# Patient Record
Sex: MALE | Race: WHITE | NOT HISPANIC OR LATINO | URBAN - METROPOLITAN AREA
[De-identification: names, ages, dates, MRNs, and addresses within clinical notes are randomized per-mention and may not be internally consistent; named-entity substitution may affect disease eponyms.]

---

## 2018-01-12 ENCOUNTER — EMERGENCY (EMERGENCY)
Facility: HOSPITAL | Age: 68
LOS: 1 days | Discharge: HOME | End: 2018-01-12
Attending: EMERGENCY MEDICINE
Payer: MEDICARE

## 2018-01-12 VITALS
RESPIRATION RATE: 16 BRPM | WEIGHT: 184.97 LBS | SYSTOLIC BLOOD PRESSURE: 111 MMHG | HEIGHT: 67 IN | DIASTOLIC BLOOD PRESSURE: 78 MMHG | HEART RATE: 89 BPM | OXYGEN SATURATION: 99 %

## 2018-01-12 LAB
ALBUMIN SERPL ELPH-MCNC: 2 G/DL — LOW (ref 3.5–5)
ANION GAP SERPL CALC-SCNC: 10 MMOL/L — SIGNIFICANT CHANGE UP (ref 5–17)
BASOPHILS # BLD AUTO: 0 K/UL — SIGNIFICANT CHANGE UP (ref 0–0.2)
BASOPHILS NFR BLD AUTO: 0.7 % — SIGNIFICANT CHANGE UP (ref 0–2)
BUN SERPL-MCNC: 101 MG/DL — HIGH (ref 7–18)
CALCIUM SERPL-MCNC: 9 MG/DL — SIGNIFICANT CHANGE UP (ref 8.4–10.5)
CHLORIDE SERPL-SCNC: 110 MMOL/L — HIGH (ref 96–108)
CO2 SERPL-SCNC: 23 MMOL/L — SIGNIFICANT CHANGE UP (ref 22–31)
EOSINOPHIL # BLD AUTO: 0.1 K/UL — SIGNIFICANT CHANGE UP (ref 0–0.5)
EOSINOPHIL NFR BLD AUTO: 1.7 % — SIGNIFICANT CHANGE UP (ref 0–6)
GLUCOSE BLDC GLUCOMTR-MCNC: 112 MG/DL — HIGH (ref 70–99)
GLUCOSE SERPL-MCNC: 100 MG/DL — HIGH (ref 70–99)
HCT VFR BLD CALC: 24.4 % — LOW (ref 39–50)
HGB BLD-MCNC: 7.3 G/DL — LOW (ref 13–17)
INR BLD: 1.16 RATIO — SIGNIFICANT CHANGE UP (ref 0.88–1.16)
LACTATE SERPL-SCNC: 0.7 MMOL/L — SIGNIFICANT CHANGE UP (ref 0.7–2)
LYMPHOCYTES # BLD AUTO: 0.7 K/UL — LOW (ref 1–3.3)
LYMPHOCYTES # BLD AUTO: 14.1 % — SIGNIFICANT CHANGE UP (ref 13–44)
MCHC RBC-ENTMCNC: 29.4 PG — SIGNIFICANT CHANGE UP (ref 27–34)
MCHC RBC-ENTMCNC: 29.8 GM/DL — LOW (ref 32–36)
MCV RBC AUTO: 98.9 FL — SIGNIFICANT CHANGE UP (ref 80–100)
MONOCYTES # BLD AUTO: 0.4 K/UL — SIGNIFICANT CHANGE UP (ref 0–0.9)
MONOCYTES NFR BLD AUTO: 8.1 % — SIGNIFICANT CHANGE UP (ref 2–14)
NEUTROPHILS # BLD AUTO: 3.6 K/UL — SIGNIFICANT CHANGE UP (ref 1.8–7.4)
NEUTROPHILS NFR BLD AUTO: 75.5 % — SIGNIFICANT CHANGE UP (ref 43–77)
PLATELET # BLD AUTO: 200 K/UL — SIGNIFICANT CHANGE UP (ref 150–400)
POTASSIUM SERPL-MCNC: 4.5 MMOL/L — SIGNIFICANT CHANGE UP (ref 3.5–5.3)
POTASSIUM SERPL-SCNC: 4.5 MMOL/L — SIGNIFICANT CHANGE UP (ref 3.5–5.3)
PROTHROM AB SERPL-ACNC: 12.7 SEC — SIGNIFICANT CHANGE UP (ref 9.8–12.7)
RBC # BLD: 2.47 M/UL — LOW (ref 4.2–5.8)
RBC # FLD: 16 % — HIGH (ref 10.3–14.5)
SODIUM SERPL-SCNC: 143 MMOL/L — SIGNIFICANT CHANGE UP (ref 135–145)
WBC # BLD: 4.7 K/UL — SIGNIFICANT CHANGE UP (ref 3.8–10.5)
WBC # FLD AUTO: 4.7 K/UL — SIGNIFICANT CHANGE UP (ref 3.8–10.5)

## 2018-01-12 PROCEDURE — 99285 EMERGENCY DEPT VISIT HI MDM: CPT | Mod: 25

## 2018-01-12 PROCEDURE — 49465 FLUORO EXAM OF G/COLON TUBE: CPT

## 2018-01-12 PROCEDURE — 43753 TX GASTRO INTUB W/ASP: CPT

## 2018-01-12 RX ORDER — SODIUM CHLORIDE 9 MG/ML
1000 INJECTION INTRAMUSCULAR; INTRAVENOUS; SUBCUTANEOUS ONCE
Qty: 0 | Refills: 0 | Status: COMPLETED | OUTPATIENT
Start: 2018-01-12 | End: 2018-01-12

## 2018-01-12 NOTE — ED ADULT NURSE NOTE - OBJECTIVE STATEMENT
pt is a 66 y/o male  sent from nursing home for g tube placement confirmation. pt is a vent dependent  . pt . no distress, g tube is clean no signs of any infection pt stable.

## 2018-01-12 NOTE — ED ADULT NURSE REASSESSMENT NOTE - NS ED NURSE REASSESS COMMENT FT1
Patient was received from [facility] . S/P tracheostomy on a vent . Blood glucose tested , results on file. Labs collected and sent. Gastrografin administered. Awaiting for CT Scan. Patient was received from [facility] . S/P tracheostomy on a vent . Blood glucose tested , results on file. Labs collected and sent. Gastrografin administered; PEG placement confirmed. Awaiting for CT Scan. Patient reposioned in bed for comfort.

## 2018-01-12 NOTE — ED PROVIDER NOTE - PROGRESS NOTE DETAILS
Nova: s/o from Dr burdick. for g tube placement.  xr shows - contrast noted in small bowel, g gtube in place.  vss. on vent  Dx g tube malfunction.  return to nsg home. Nova: s/o from Dr burdick. for g tube placement.  xr shows - contrast noted in small bowel, g gtube in place.  Called by radialogist concern for sigmoid volvulus-  Spoke with Surgical house staff and rec to call Gi.  GI called Dr Navarro and rec to obtain CT abd with po contrast to evaluate volvulus.  po contrast given. pending CT scan.  vss. on vent  Dx g tube malfunction.  return to [facility] home. Nova: pt too tremulous to perform scan. ativan 1mg ivp given and will go for scan. informed the importance of completing the scan.  S/o to Dr Kang to f/u on ct scan and call Dr Navarro and surgical house staff. Nova: pt too tremulous to perform scan. ativan 1mg ivp given and will go for scan. informed the importance of completing the scan. Nova: 2 ativan IVP and ns given to assist in tremors.  Spoke with Radiologist Dr Liz  [phone] and agree to do the scan but should be more sedated and avoid tremors.  2 mg ivp ordered and keppra 1g.  Attempted to call Healthcare proxy on the [facility] home list but none are active.  Attempted to Called Dr Ignacio [phone] unable to reach.  PT is a DNR/ DNI/ Do Not Hospitalized.  Pending ct.  S/o to Dr Kang to f/u on ct scan and call Dr Navarro and surgical house staff. Nova: 2 ativan IVP and ns given to assist in tremors.  Spoke with Radiologist Dr Liz  [phone] and agree to do the scan but should be more sedated and avoid tremors.  2 mg ivp ordered and keppra 1g- for possible seizures.  Attempted to call Healthcare proxy on the [facility] [place] but none are active.  Attempted to Called Dr Ignacio [phone] unable to reach.   Pending ct. Nova: After reviewing the [facility] home paper- pt is a DNR/DNI/Do Not Hospitalized/ no blood draw, no imaging- Will cancel Ct and sent pt back to [facility] home as imaging will not provide any further utility.  Dx G tube replacement with possible volvulus. Attempted to call all the numbers on file unable to reach,.  Based on my medical decision and condition of this patient it's inhumane to subject pt to further testing and unnecessary treatment. ambulance called to sent back [place] [facility]. Nova: s/o from Dr burdick. for g tube placement.  xr shows - contrast noted in small bowel, g gtube in place.  Called by radialogist concern for sigmoid volvulus-  Spoke with Surgical house staff and rec to call Gi.  GI called Dr Navarro and rec to obtain CT abd with po contrast to evaluate volvulus.  po contrast given. pending CT scan.  vss. on vent Labs drawn for concern for possible surgical intervention  Dx g tube malfunction.  return to [facility] home. Nova: 2 ativan IVP and ns given to assist in tremors.  Spoke with Radiologist Dr Liz  [phone] and agree to do the scan but should be more sedated and avoid tremors.  2 mg ivp ordered and keppra 1g- for possible seizures.  Attempted to call Healthcare proxy on the [facility] home list but none are active Natividad Booth [phone], Michel Drake [phone],.  Attempted to Called Dr Ignacio [phone] unable to reach.   Pending ct.

## 2018-01-13 VITALS — HEART RATE: 80 BPM | OXYGEN SATURATION: 100 %

## 2018-01-13 LAB
ALP SERPL-CCNC: 72 U/L — SIGNIFICANT CHANGE UP (ref 40–120)
ALT FLD-CCNC: <6 U/L DA — LOW (ref 10–60)
AST SERPL-CCNC: 7 U/L — LOW (ref 10–40)
BILIRUB SERPL-MCNC: 0.3 MG/DL — SIGNIFICANT CHANGE UP (ref 0.2–1.2)
CREAT SERPL-MCNC: 4.49 MG/DL — HIGH (ref 0.5–1.3)
PROT SERPL-MCNC: 6.1 G/DL — SIGNIFICANT CHANGE UP (ref 6–8.3)

## 2018-01-13 PROCEDURE — 83605 ASSAY OF LACTIC ACID: CPT

## 2018-01-13 PROCEDURE — 96372 THER/PROPH/DIAG INJ SC/IM: CPT | Mod: XU

## 2018-01-13 PROCEDURE — 94003 VENT MGMT INPAT SUBQ DAY: CPT

## 2018-01-13 PROCEDURE — 43753 TX GASTRO INTUB W/ASP: CPT

## 2018-01-13 PROCEDURE — 82962 GLUCOSE BLOOD TEST: CPT

## 2018-01-13 PROCEDURE — 96375 TX/PRO/DX INJ NEW DRUG ADDON: CPT

## 2018-01-13 PROCEDURE — 80053 COMPREHEN METABOLIC PANEL: CPT

## 2018-01-13 PROCEDURE — 86900 BLOOD TYPING SEROLOGIC ABO: CPT

## 2018-01-13 PROCEDURE — 96374 THER/PROPH/DIAG INJ IV PUSH: CPT

## 2018-01-13 PROCEDURE — 85610 PROTHROMBIN TIME: CPT

## 2018-01-13 PROCEDURE — 99285 EMERGENCY DEPT VISIT HI MDM: CPT | Mod: 25

## 2018-01-13 PROCEDURE — 86850 RBC ANTIBODY SCREEN: CPT

## 2018-01-13 PROCEDURE — 86901 BLOOD TYPING SEROLOGIC RH(D): CPT

## 2018-01-13 PROCEDURE — 85027 COMPLETE CBC AUTOMATED: CPT

## 2018-01-13 PROCEDURE — 49465 FLUORO EXAM OF G/COLON TUBE: CPT

## 2018-01-13 PROCEDURE — 94002 VENT MGMT INPAT INIT DAY: CPT

## 2018-01-13 RX ORDER — SODIUM CHLORIDE 9 MG/ML
1000 INJECTION INTRAMUSCULAR; INTRAVENOUS; SUBCUTANEOUS ONCE
Qty: 0 | Refills: 0 | Status: COMPLETED | OUTPATIENT
Start: 2018-01-13 | End: 2018-01-13

## 2018-01-13 RX ORDER — LEVETIRACETAM 250 MG/1
1000 TABLET, FILM COATED ORAL ONCE
Qty: 0 | Refills: 0 | Status: COMPLETED | OUTPATIENT
Start: 2018-01-13 | End: 2018-01-13

## 2018-01-13 RX ADMIN — Medication 2 MILLIGRAM(S): at 02:05

## 2018-01-13 RX ADMIN — Medication 1 MILLIGRAM(S): at 01:36

## 2018-01-13 RX ADMIN — SODIUM CHLORIDE 1000 MILLILITER(S): 9 INJECTION INTRAMUSCULAR; INTRAVENOUS; SUBCUTANEOUS at 02:06

## 2018-01-13 RX ADMIN — LEVETIRACETAM 400 MILLIGRAM(S): 250 TABLET, FILM COATED ORAL at 02:22

## 2018-01-13 NOTE — ED ADULT NURSE REASSESSMENT NOTE - NS ED NURSE REASSESS COMMENT FT1
Patient is being discharged, hemodynamically stable and in no acute distress. Medication administered as per order. Perineal and suction care provided.

## 2023-03-13 NOTE — ED PROCEDURE NOTE - CPROC ED GASTRIC INTUB DETAIL1
Patient is requesting a new referral to Derm for moles and to establish care with a new dermatologist. Referral has been pended.  Thanks   g tube placement
